# Patient Record
Sex: FEMALE | NOT HISPANIC OR LATINO | URBAN - METROPOLITAN AREA
[De-identification: names, ages, dates, MRNs, and addresses within clinical notes are randomized per-mention and may not be internally consistent; named-entity substitution may affect disease eponyms.]

---

## 2019-11-07 NOTE — PATIENT DISCUSSION
Discussed the importance of blood sugar control in the prevention of ocular complications and use of AMSLER GRID.

## 2019-12-12 ENCOUNTER — IMPORTED ENCOUNTER (OUTPATIENT)
Dept: URBAN - METROPOLITAN AREA CLINIC 1 | Facility: CLINIC | Age: 79
End: 2019-12-12

## 2019-12-12 PROCEDURE — 92015 DETERMINE REFRACTIVE STATE: CPT

## 2019-12-12 PROCEDURE — 92004 COMPRE OPH EXAM NEW PT 1/>: CPT

## 2019-12-12 NOTE — PATIENT DISCUSSION
1.  Cataract OU -- Observe for now without intervention. The patient was advised to contact us if any change or worsening of vision2. Dry Eyes OU -- Recommend the use of ATs BID OU. 3. Arcus OU -- Observe. DMV Form completed and VF performed today. See attachments. MRx given today. Return for an appointment in 1 year for a 30 with Dr. Brandyn Angel.

## 2019-12-18 PROBLEM — H18.413: Noted: 2019-12-18

## 2019-12-18 PROBLEM — H25.813: Noted: 2019-12-18

## 2019-12-18 PROBLEM — H04.123: Noted: 2019-12-18

## 2022-04-02 ASSESSMENT — VISUAL ACUITY
OS_CC: 20/40
OD_CC: 20/80-2
OD_GLARE: 20/100
OS_GLARE: 20/80

## 2022-04-02 ASSESSMENT — TONOMETRY
OD_IOP_MMHG: 19
OS_IOP_MMHG: 19
